# Patient Record
Sex: MALE | Race: WHITE | NOT HISPANIC OR LATINO | Employment: FULL TIME | URBAN - METROPOLITAN AREA
[De-identification: names, ages, dates, MRNs, and addresses within clinical notes are randomized per-mention and may not be internally consistent; named-entity substitution may affect disease eponyms.]

---

## 2022-06-16 DIAGNOSIS — M65.331 TRIGGER FINGER, RIGHT MIDDLE FINGER: ICD-10-CM

## 2022-06-16 DIAGNOSIS — G56.03 BILATERAL CARPAL TUNNEL SYNDROME: Primary | ICD-10-CM

## 2022-06-23 ENCOUNTER — OFFICE VISIT (OUTPATIENT)
Dept: ORTHOPEDICS | Facility: CLINIC | Age: 57
End: 2022-06-23
Payer: MEDICARE

## 2022-06-23 VITALS — WEIGHT: 189.19 LBS | HEIGHT: 66 IN | BODY MASS INDEX: 30.41 KG/M2

## 2022-06-23 DIAGNOSIS — G56.03 BILATERAL CARPAL TUNNEL SYNDROME: Primary | ICD-10-CM

## 2022-06-23 DIAGNOSIS — G56.01 RIGHT CARPAL TUNNEL SYNDROME: ICD-10-CM

## 2022-06-23 DIAGNOSIS — M65.331 TRIGGER FINGER, RIGHT MIDDLE FINGER: ICD-10-CM

## 2022-06-23 LAB
ANION GAP SERPL CALC-SCNC: 7 MMOL/L (ref 3–11)
BASOPHILS NFR BLD: 0.8 % (ref 0–3)
BUN SERPL-MCNC: 27 MG/DL (ref 7–18)
BUN/CREAT SERPL: 16.36 RATIO (ref 7–18)
CALCIUM SERPL-MCNC: 8.6 MG/DL (ref 8.8–10.5)
CHLORIDE SERPL-SCNC: 110 MMOL/L (ref 100–108)
CO2 SERPL-SCNC: 26 MMOL/L (ref 21–32)
CREAT SERPL-MCNC: 1.65 MG/DL (ref 0.7–1.3)
EOSINOPHIL NFR BLD: 4 % (ref 1–3)
ERYTHROCYTE [DISTWIDTH] IN BLOOD BY AUTOMATED COUNT: 13.8 % (ref 12.5–18)
GFR ESTIMATION: 43
GLUCOSE SERPL-MCNC: 96 MG/DL (ref 70–110)
HCT VFR BLD AUTO: 46.4 % (ref 42–52)
HGB BLD-MCNC: 15.9 G/DL (ref 14–18)
LYMPHOCYTES NFR BLD: 19.9 % (ref 25–40)
MCH RBC QN AUTO: 32.9 PG (ref 27–31.2)
MCHC RBC AUTO-ENTMCNC: 34.3 G/DL (ref 31.8–35.4)
MCV RBC AUTO: 95.9 FL (ref 80–97)
MONOCYTES NFR BLD: 11.1 % (ref 1–15)
NEUTROPHILS # BLD AUTO: 5.36 10*3/UL (ref 1.8–7.7)
NEUTROPHILS NFR BLD: 63.6 % (ref 37–80)
NUCLEATED RED BLOOD CELLS: 0 %
PLATELETS: 182 10*3/UL (ref 142–424)
POTASSIUM SERPL-SCNC: 4.1 MMOL/L (ref 3.6–5.2)
RBC # BLD AUTO: 4.84 10*6/UL (ref 4.7–6.1)
SODIUM BLD-SCNC: 143 MMOL/L (ref 135–145)
WBC # BLD: 8.4 10*3/UL (ref 4.6–10.2)

## 2022-06-23 PROCEDURE — 99203 PR OFFICE/OUTPT VISIT, NEW, LEVL III, 30-44 MIN: ICD-10-PCS | Mod: S$GLB,,, | Performed by: ORTHOPAEDIC SURGERY

## 2022-06-23 PROCEDURE — 99203 OFFICE O/P NEW LOW 30 MIN: CPT | Mod: S$GLB,,, | Performed by: ORTHOPAEDIC SURGERY

## 2022-06-23 RX ORDER — PANTOPRAZOLE SODIUM 40 MG/1
40 TABLET, DELAYED RELEASE ORAL DAILY
COMMUNITY
Start: 2022-05-26

## 2022-06-23 RX ORDER — ATORVASTATIN CALCIUM 40 MG/1
40 TABLET, FILM COATED ORAL NIGHTLY
COMMUNITY
Start: 2022-02-16

## 2022-06-23 NOTE — PROGRESS NOTES
Subjective:      Patient ID: Eliseo Fox is a 56 y.o. male.    Chief Complaint: Pain of the Right Hand and Pain of the Left Hand    HPI 56-year-old man with a long history of bilateral hand numbness.  He comes in with EMG and nerve conduction studies showing bilateral severe carpal tunnel syndrome.  He has been wearing splints without relief.    Review of Systems   Constitutional: Negative for fever and weight loss.   Cardiovascular: Negative for chest pain and leg swelling.   Musculoskeletal: Negative for arthritis, joint pain, joint swelling, muscle weakness and stiffness.   Gastrointestinal: Negative for change in bowel habit.   Genitourinary: Negative for bladder incontinence and hematuria.   Neurological: Positive for focal weakness, numbness, paresthesias and sensory change.         Objective:      Patient has decreased sensation to light touch in the median nerve distribution.  He has a positive carpal compression test.  He has normal capillary refill.      Ortho/SPM Exam            Assessment:       Encounter Diagnoses   Name Primary?    Bilateral carpal tunnel syndrome Yes    Trigger finger, right middle finger     Right carpal tunnel syndrome           Plan:       Eliseo was seen today for pain and pain.    Diagnoses and all orders for this visit:    Bilateral carpal tunnel syndrome  -     Ambulatory referral/consult to Orthopedics    Trigger finger, right middle finger  -     Ambulatory referral/consult to Orthopedics    Right carpal tunnel syndrome  -     Basic Metabolic Panel; Future  -     CBC Auto Differential; Future  -     EKG 12-lead; Future  -     X-Ray Chest PA And Lateral; Future  -     Basic Metabolic Panel  -     CBC Auto Differential  -     EKG 12-lead  -     X-Ray Chest PA And Lateral    Given the severity of changes on the EMG and nerve conduction studies it is my recommendation he has carpal tunnel release.  We will do this in the near future at his convenience

## 2022-07-06 ENCOUNTER — OUTSIDE PLACE OF SERVICE (OUTPATIENT)
Dept: ADMINISTRATIVE | Facility: OTHER | Age: 57
End: 2022-07-06
Payer: MEDICARE

## 2022-07-06 PROCEDURE — 64721 PR REVISE MEDIAN N/CARPAL TUNNEL SURG: ICD-10-PCS | Mod: RT,,, | Performed by: ORTHOPAEDIC SURGERY

## 2022-07-06 PROCEDURE — 64721 CARPAL TUNNEL SURGERY: CPT | Mod: RT,,, | Performed by: ORTHOPAEDIC SURGERY

## 2022-07-14 ENCOUNTER — OFFICE VISIT (OUTPATIENT)
Dept: ORTHOPEDICS | Facility: CLINIC | Age: 57
End: 2022-07-14
Payer: MEDICARE

## 2022-07-14 DIAGNOSIS — G56.03 BILATERAL CARPAL TUNNEL SYNDROME: Primary | ICD-10-CM

## 2022-07-14 PROCEDURE — 99024 POSTOP FOLLOW-UP VISIT: CPT | Mod: S$GLB,POP,, | Performed by: ORTHOPAEDIC SURGERY

## 2022-07-14 PROCEDURE — 99024 PR POST-OP FOLLOW-UP VISIT: ICD-10-PCS | Mod: S$GLB,POP,, | Performed by: ORTHOPAEDIC SURGERY

## 2022-07-14 NOTE — PROGRESS NOTES
Subjective:      Patient ID: Eliseo Fox is a 56 y.o. male.    Chief Complaint: Post-op Evaluation of the Right Hand and Follow-up    HPI patient is a days postop right carpal tunnel release.  He has no complaints.    ROS unchanged from prior visit      Objective:        Incision is clean.  There is no drainage.  He has mild swelling and tenderness.    Ortho/SPM Exam            Assessment:       Encounter Diagnosis   Name Primary?    Bilateral carpal tunnel syndrome Yes          Plan:       Eliseo was seen today for follow-up and post-op evaluation.    Diagnoses and all orders for this visit:    Bilateral carpal tunnel syndrome    Sutures are removed today.  He will contact us when he decides to proceed with the other side.

## 2022-08-25 ENCOUNTER — OFFICE VISIT (OUTPATIENT)
Dept: ORTHOPEDICS | Facility: CLINIC | Age: 57
End: 2022-08-25
Payer: MEDICARE

## 2022-08-25 VITALS — BODY MASS INDEX: 30.41 KG/M2 | HEIGHT: 66 IN | WEIGHT: 189.19 LBS

## 2022-08-25 DIAGNOSIS — G56.03 BILATERAL CARPAL TUNNEL SYNDROME: Primary | ICD-10-CM

## 2022-08-25 LAB
ANION GAP SERPL CALC-SCNC: 7 MMOL/L (ref 3–11)
BASOPHILS NFR BLD: 1.1 % (ref 0–3)
BUN SERPL-MCNC: 19 MG/DL (ref 7–18)
BUN/CREAT SERPL: 14.61 RATIO (ref 7–18)
CALCIUM SERPL-MCNC: 9.1 MG/DL (ref 8.8–10.5)
CHLORIDE SERPL-SCNC: 108 MMOL/L (ref 100–108)
CO2 SERPL-SCNC: 27 MMOL/L (ref 21–32)
CREAT SERPL-MCNC: 1.3 MG/DL (ref 0.7–1.3)
EOSINOPHIL NFR BLD: 4.2 % (ref 1–3)
ERYTHROCYTE [DISTWIDTH] IN BLOOD BY AUTOMATED COUNT: 12.8 % (ref 12.5–18)
GFR ESTIMATION: 57
GLUCOSE SERPL-MCNC: 110 MG/DL (ref 70–110)
HCT VFR BLD AUTO: 49.5 % (ref 42–52)
HGB BLD-MCNC: 16.5 G/DL (ref 14–18)
LYMPHOCYTES NFR BLD: 18.1 % (ref 25–40)
MCH RBC QN AUTO: 31.7 PG (ref 27–31.2)
MCHC RBC AUTO-ENTMCNC: 33.3 G/DL (ref 31.8–35.4)
MCV RBC AUTO: 95.2 FL (ref 80–97)
MONOCYTES NFR BLD: 10.7 % (ref 1–15)
NEUTROPHILS # BLD AUTO: 6.42 10*3/UL (ref 1.8–7.7)
NEUTROPHILS NFR BLD: 65.1 % (ref 37–80)
NUCLEATED RED BLOOD CELLS: 0 %
PLATELETS: 236 10*3/UL (ref 142–424)
POTASSIUM SERPL-SCNC: 4.5 MMOL/L (ref 3.6–5.2)
RBC # BLD AUTO: 5.2 10*6/UL (ref 4.7–6.1)
SODIUM BLD-SCNC: 142 MMOL/L (ref 135–145)
WBC # BLD: 9.9 10*3/UL (ref 4.6–10.2)

## 2022-08-25 PROCEDURE — 99499 NO LOS: ICD-10-PCS | Mod: S$GLB,,, | Performed by: ORTHOPAEDIC SURGERY

## 2022-08-25 PROCEDURE — 99499 UNLISTED E&M SERVICE: CPT | Mod: S$GLB,,, | Performed by: ORTHOPAEDIC SURGERY

## 2022-08-25 RX ORDER — PROMETHAZINE HYDROCHLORIDE AND CODEINE PHOSPHATE 6.25; 1 MG/5ML; MG/5ML
SOLUTION ORAL
COMMUNITY
Start: 2022-08-08

## 2022-08-25 RX ORDER — PREDNISONE 5 MG/1
TABLET ORAL
COMMUNITY
Start: 2022-08-08 | End: 2022-09-12

## 2022-08-25 RX ORDER — TAMSULOSIN HYDROCHLORIDE 0.4 MG/1
1 CAPSULE ORAL DAILY
COMMUNITY
Start: 2022-06-25

## 2022-08-25 NOTE — PROGRESS NOTES
Subjective:      Patient ID: Eliseo Fox is a 57 y.o. male.    Chief Complaint: Pain of the Left Hand    HPI 57-year-old man status post right carpal tunnel release comes in today to schedule his left side.    ROS  unchanged from prior visit  Objective:        Unchanged from prior visit    Ortho/SPM Exam            Assessment:       Encounter Diagnosis   Name Primary?    Bilateral carpal tunnel syndrome Yes          Plan:       Eliseo was seen today for pain.    Diagnoses and all orders for this visit:    Bilateral carpal tunnel syndrome  -     CBC Auto Differential; Future  -     Basic Metabolic Panel; Future  -     CBC Auto Differential  -     Basic Metabolic Panel      We will do this in the near future at his convenience.

## 2022-08-30 ENCOUNTER — OUTSIDE PLACE OF SERVICE (OUTPATIENT)
Dept: ADMINISTRATIVE | Facility: OTHER | Age: 57
End: 2022-08-30
Payer: MEDICARE

## 2022-08-30 PROCEDURE — 64721 CARPAL TUNNEL SURGERY: CPT | Mod: 79,LT,, | Performed by: ORTHOPAEDIC SURGERY

## 2022-08-30 PROCEDURE — 64721 PR REVISE MEDIAN N/CARPAL TUNNEL SURG: ICD-10-PCS | Mod: 79,LT,, | Performed by: ORTHOPAEDIC SURGERY

## 2022-08-31 ENCOUNTER — OUTSIDE PLACE OF SERVICE (OUTPATIENT)
Dept: ADMINISTRATIVE | Facility: OTHER | Age: 57
End: 2022-08-31
Payer: MEDICARE

## 2022-09-12 ENCOUNTER — OFFICE VISIT (OUTPATIENT)
Dept: ORTHOPEDICS | Facility: CLINIC | Age: 57
End: 2022-09-12
Payer: MEDICARE

## 2022-09-12 DIAGNOSIS — G56.03 BILATERAL CARPAL TUNNEL SYNDROME: Primary | ICD-10-CM

## 2022-09-12 PROCEDURE — 99024 PR POST-OP FOLLOW-UP VISIT: ICD-10-PCS | Mod: S$GLB,POP,, | Performed by: ORTHOPAEDIC SURGERY

## 2022-09-12 PROCEDURE — 99024 POSTOP FOLLOW-UP VISIT: CPT | Mod: S$GLB,POP,, | Performed by: ORTHOPAEDIC SURGERY

## 2022-09-12 RX ORDER — GABAPENTIN 300 MG/1
CAPSULE ORAL
COMMUNITY
Start: 2022-08-28

## 2022-09-12 RX ORDER — AMLODIPINE BESYLATE 5 MG/1
5 TABLET ORAL DAILY
COMMUNITY
Start: 2022-08-28

## 2022-09-12 RX ORDER — OXYCODONE AND ACETAMINOPHEN 5; 325 MG/1; MG/1
1 TABLET ORAL 3 TIMES DAILY PRN
COMMUNITY
Start: 2022-08-30

## 2022-09-12 RX ORDER — PREDNISONE 20 MG/1
TABLET ORAL
COMMUNITY
Start: 2022-09-06

## 2022-09-12 RX ORDER — LISINOPRIL 20 MG/1
20 TABLET ORAL 2 TIMES DAILY
COMMUNITY
Start: 2022-08-28

## 2022-09-12 NOTE — PROGRESS NOTES
Subjective:      Patient ID: Eliseo Fox is a 57 y.o. male.    Chief Complaint: Post-op Evaluation of the Left Hand    HPI patient is 2 weeks postop left carpal tunnel release.    ROS unchanged from prior visit      Objective:      Incision is clean.  There is no swelling or drainage.      Ortho/SPM Exam            Assessment:       Encounter Diagnosis   Name Primary?    Bilateral carpal tunnel syndrome Yes          Plan:       Eliseo was seen today for post-op evaluation.    Diagnoses and all orders for this visit:    Bilateral carpal tunnel syndrome    Sutures are removed today and he is encouraged to massage the scar.  Return p.r.n.